# Patient Record
Sex: FEMALE | Race: AMERICAN INDIAN OR ALASKA NATIVE
[De-identification: names, ages, dates, MRNs, and addresses within clinical notes are randomized per-mention and may not be internally consistent; named-entity substitution may affect disease eponyms.]

---

## 2017-05-01 ENCOUNTER — HOSPITAL ENCOUNTER (EMERGENCY)
Dept: HOSPITAL 5 - ED | Age: 42
LOS: 1 days | Discharge: HOME | End: 2017-05-02
Payer: MEDICAID

## 2017-05-01 VITALS — DIASTOLIC BLOOD PRESSURE: 64 MMHG | SYSTOLIC BLOOD PRESSURE: 131 MMHG

## 2017-05-01 DIAGNOSIS — S16.1XXA: ICD-10-CM
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
## 2017-05-01 DIAGNOSIS — S39.012A: Primary | ICD-10-CM
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
## 2017-05-01 PROCEDURE — 99282 EMERGENCY DEPT VISIT SF MDM: CPT
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
## 2020-01-18 ENCOUNTER — HOSPITAL ENCOUNTER (EMERGENCY)
Dept: HOSPITAL 5 - ED | Age: 45
Discharge: LEFT BEFORE DISCHARGE | End: 2020-01-18
Payer: SELF-PAY

## 2020-01-18 DIAGNOSIS — R51: Primary | ICD-10-CM

## 2020-01-18 DIAGNOSIS — Z53.21: ICD-10-CM

## 2020-01-18 NOTE — EVENT NOTE
ED Screening Note


Date of service: 01/18/20


Time: 16:52


ED Screening Note: 


Pt c/o cotton being stuck in left ear x today and facial pain, pressure, 

burning, and swelling x yesterday





This initial assessment/diagnostic orders/clinical plan/treatment(s) is/are 

subject to change based on patients health status, clinical progression and re-

assessment by fellow clinical providers in the ED. Further treatment and workup 

at subsequent clinical providers discretion. Patient/guardian urged not to elope

from the ED as their condition may be serious if not clinically assessed and 

managed. 





Initial orders include: ACC


cotton removed